# Patient Record
Sex: FEMALE | Race: NATIVE HAWAIIAN OR OTHER PACIFIC ISLANDER | Employment: FULL TIME | URBAN - METROPOLITAN AREA
[De-identification: names, ages, dates, MRNs, and addresses within clinical notes are randomized per-mention and may not be internally consistent; named-entity substitution may affect disease eponyms.]

---

## 2022-09-14 LAB
CHOLEST SERPL-MCNC: 159 MG/DL
GLUCOSE SERPL-MCNC: 96 MG/DL (ref 65–100)
HDLC SERPL-MCNC: 59 MG/DL (ref 40–60)
LDLC SERPL CALC-MCNC: 81.8 MG/DL
TRIGL SERPL-MCNC: 91 MG/DL (ref 35–150)

## 2022-11-14 ENCOUNTER — OFFICE VISIT (OUTPATIENT)
Dept: OCCUPATIONAL MEDICINE | Age: 27
End: 2022-11-14

## 2022-11-14 VITALS
DIASTOLIC BLOOD PRESSURE: 78 MMHG | SYSTOLIC BLOOD PRESSURE: 110 MMHG | OXYGEN SATURATION: 99 % | TEMPERATURE: 100.3 F | RESPIRATION RATE: 17 BRPM | HEIGHT: 65 IN | WEIGHT: 151.2 LBS | HEART RATE: 118 BPM | BODY MASS INDEX: 25.19 KG/M2

## 2022-11-14 DIAGNOSIS — R09.89 RUNNY NOSE: ICD-10-CM

## 2022-11-14 DIAGNOSIS — H66.93 BACTERIAL INFECTION OF BOTH EARS: ICD-10-CM

## 2022-11-14 DIAGNOSIS — B96.89 BACTERIAL INFECTION OF BOTH EARS: ICD-10-CM

## 2022-11-14 DIAGNOSIS — J10.1 INFLUENZA A: ICD-10-CM

## 2022-11-14 DIAGNOSIS — B00.1 COLD SORE: ICD-10-CM

## 2022-11-14 DIAGNOSIS — R05.9 COUGH IN ADULT PATIENT: Primary | ICD-10-CM

## 2022-11-14 PROBLEM — E55.9 VITAMIN D DEFICIENCY: Status: ACTIVE | Noted: 2020-05-14

## 2022-11-14 PROBLEM — L40.50 PSORIATIC ARTHRITIS (HCC): Status: ACTIVE | Noted: 2020-05-14

## 2022-11-14 PROBLEM — G89.29 CHRONIC BILATERAL LOW BACK PAIN WITHOUT SCIATICA: Status: ACTIVE | Noted: 2020-05-14

## 2022-11-14 PROBLEM — M54.50 CHRONIC BILATERAL LOW BACK PAIN WITHOUT SCIATICA: Status: ACTIVE | Noted: 2020-05-14

## 2022-11-14 PROBLEM — L40.9 PSORIASIS: Status: ACTIVE | Noted: 2020-05-14

## 2022-11-14 LAB
INFLUENZA A ANTIGEN, POC: POSITIVE
INFLUENZA B ANTIGEN, POC: NEGATIVE
SARS-COV-2 RNA, POC: NEGATIVE

## 2022-11-14 RX ORDER — VALACYCLOVIR HYDROCHLORIDE 1 G/1
2000 TABLET, FILM COATED ORAL 2 TIMES DAILY
Qty: 8 TABLET | Refills: 2 | Status: SHIPPED | OUTPATIENT
Start: 2022-11-14 | End: 2022-11-16

## 2022-11-14 RX ORDER — IBUPROFEN 800 MG/1
TABLET ORAL
COMMUNITY

## 2022-11-14 RX ORDER — SECUKINUMAB 150 MG/ML
INJECTION SUBCUTANEOUS
COMMUNITY
Start: 2022-10-24 | End: 2022-11-14 | Stop reason: SDUPTHER

## 2022-11-14 RX ORDER — FLUTICASONE PROPIONATE 50 MCG
2 SPRAY, SUSPENSION (ML) NASAL 2 TIMES DAILY
COMMUNITY
Start: 2017-05-03

## 2022-11-14 RX ORDER — CLOBETASOL PROPIONATE 0.5 MG/G
OINTMENT TOPICAL 2 TIMES DAILY
COMMUNITY
Start: 2020-05-14

## 2022-11-14 RX ORDER — AMOXICILLIN AND CLAVULANATE POTASSIUM 875; 125 MG/1; MG/1
1 TABLET, FILM COATED ORAL 2 TIMES DAILY
Qty: 20 TABLET | Refills: 0 | Status: SHIPPED | OUTPATIENT
Start: 2022-11-14 | End: 2022-11-24

## 2022-11-14 RX ORDER — MOMETASONE FUROATE 1 MG/ML
SOLUTION TOPICAL
COMMUNITY
Start: 2019-03-21

## 2022-11-14 RX ORDER — TRIAMCINOLONE ACETONIDE 1 MG/G
CREAM TOPICAL
COMMUNITY
Start: 2019-03-21

## 2022-11-14 RX ORDER — NAPROXEN 250 MG/1
TABLET ORAL
COMMUNITY

## 2022-11-14 RX ORDER — VALACYCLOVIR HYDROCHLORIDE 1 G/1
2000 TABLET, FILM COATED ORAL 2 TIMES DAILY
Qty: 8 TABLET | Refills: 2 | Status: SHIPPED | OUTPATIENT
Start: 2022-11-14 | End: 2022-11-14

## 2022-11-14 ASSESSMENT — ENCOUNTER SYMPTOMS
WHEEZING: 0
EYE PAIN: 0
NAUSEA: 0
DIARRHEA: 1
CHEST TIGHTNESS: 0
EYE DISCHARGE: 0
SORE THROAT: 1
COUGH: 1
RHINORRHEA: 1
TROUBLE SWALLOWING: 0
SWOLLEN GLANDS: 0
ABDOMINAL PAIN: 0
VOMITING: 0
SHORTNESS OF BREATH: 0
EYE ITCHING: 0
EYE REDNESS: 0
SINUS PAIN: 1

## 2022-11-14 ASSESSMENT — PATIENT HEALTH QUESTIONNAIRE - PHQ9
2. FEELING DOWN, DEPRESSED OR HOPELESS: 0
SUM OF ALL RESPONSES TO PHQ9 QUESTIONS 1 & 2: 0
SUM OF ALL RESPONSES TO PHQ QUESTIONS 1-9: 0
1. LITTLE INTEREST OR PLEASURE IN DOING THINGS: 0
SUM OF ALL RESPONSES TO PHQ QUESTIONS 1-9: 0

## 2022-11-14 NOTE — PROGRESS NOTES
PROGRESS NOTE    SUBJECTIVE     Surinder Brock is a 32 y.o. female seen for:    Chief Complaint    URI     . Patient states that she has had a cough and runny nose since Thursday night and believes she's been running a fever since then as well. Patient slept for 15 hours on Thursday night and went to urgent care on Friday where she had tested negative for COVID-19 and influenza. Her highest fever was 102.7F on Friday night. Patient states that the fever has persisted and usually comes back at night. Patient has been taking ibuprofen 800 mg every 8 hours and acetaminophen 1000 mg every 8 hours (alternating). Patient states that she continued to take ibuprofen and acetaminophen for her fever and body aches on Saturday, but feels she got much worse last night and now can't hear well out of her left ear. Patient has no appetite but has been trying to hydrate well with 3 x 16 ounces of water per day plus one bottle of Gatorade and some broth. Patient noted some diarrhea yesterday but hasn't eaten solid foods for several days. Patient states that her cough is productive with yellow brown sputum. Patient has fatigue with exertion and notes some dizziness with position changes of her head or when getting up from a chair. Patient's daughter (almost 3years old) was sick with influenza A since Friday 11/4/22. She is doing better now but ended up needing an oral steroid and albuterol inhaler for the cough. Her  had symptoms but didn't get tested feeling and is feeling better now. Patient notes that she has gotten cold sores in the past and now has one in the corner of the left side of her mouth that started with a burning sensation before she noted the cold sore. She has never taken Valtrex to help the cold sores go away faster before. Patient states her  gets cold sores as well.      Patient takes Cosentyx and topical steroids for plaque psoriasis and psoriatic arthritis, which has been managing her rashes and joint pains well but has noticed more joint pains since becoming ill with influenza A. Patient is not due for her next dose of Cosentyx for 2 more weeks. URI   This is a new problem. The current episode started in the past 7 days. The problem has been gradually worsening. The maximum temperature recorded prior to her arrival was 101 - 101.9 F. Associated symptoms include coughing, diarrhea, ear pain, headaches, a plugged ear sensation, a rash, rhinorrhea, sinus pain and a sore throat. Pertinent negatives include no abdominal pain, chest pain, congestion, dysuria, joint pain, joint swelling, nausea, neck pain, sneezing, swollen glands, vomiting or wheezing. She has tried acetaminophen and NSAIDs for the symptoms. The treatment provided mild relief. Current Outpatient Medications   Medication Sig Dispense Refill    triamcinolone (KENALOG) 0.1 % cream APPLY TO AFFECTED AREA TWICE A DAY X2WEEKS,THEN AS NEEDED FOR FLARES      naproxen (NAPROSYN) 250 MG tablet Take by mouth      mometasone (ELOCON) 0.1 % lotion APPLY TO AFFECTED AREA IN SCALP & EARS X2 WEEKS,THEN AS NEEDED FOR FLARES      ibuprofen (ADVIL;MOTRIN) 800 MG tablet Take by mouth      fluticasone (FLONASE) 50 MCG/ACT nasal spray 2 sprays by Nasal route 2 times daily      clobetasol (TEMOVATE) 0.05 % ointment Apply topically 2 times daily      amoxicillin-clavulanate (AUGMENTIN) 875-125 MG per tablet Take 1 tablet by mouth 2 times daily for 10 days 20 tablet 0    valACYclovir (VALTREX) 1 g tablet Take 2 tablets by mouth 2 times daily for 2 days 8 tablet 2    loratadine (CLARITIN) 10 MG tablet Take 10 mg by mouth      secukinumab (COSENTYX) 150 MG/ML SOSY Inject into the skin      brompheniramine-pseudoephedrine-DM 2-30-10 MG/5ML syrup Take 5 mLs by mouth 4 times daily as needed (Patient not taking: Reported on 11/14/2022)       No current facility-administered medications for this visit.       Allergies   Allergen Reactions    Pollen Extract Itching Past Medical History:   Diagnosis Date    Allergic rhinitis     Cold sore     Plaque psoriasis     Psoriatic arthritis ([address]      Past Surgical History:   Procedure Laterality Date    WISDOM TOOTH EXTRACTION         Social History     Tobacco Use    Smoking status: Never    Smokeless tobacco: Never   Vaping Use    Vaping Use: Never used   Substance Use Topics    Alcohol use: Yes     Comment: Once week - glass of wine    Drug use: Never        No family history on file. Review of Systems   Constitutional:  Positive for appetite change, chills, diaphoresis, fatigue and fever. Negative for unexpected weight change. HENT:  Positive for ear pain, postnasal drip, rhinorrhea, sinus pain and sore throat. Negative for congestion, drooling, ear discharge, sneezing, tinnitus and trouble swallowing. Eyes:  Negative for pain, discharge, redness, itching and visual disturbance. Respiratory:  Positive for cough. Negative for chest tightness, shortness of breath and wheezing. Cardiovascular:  Negative for chest pain and palpitations. Gastrointestinal:  Positive for diarrhea. Negative for abdominal pain, nausea and vomiting. Genitourinary:  Negative for dysuria. Musculoskeletal:  Positive for myalgias. Negative for gait problem, joint pain, neck pain and neck stiffness. Skin:  Positive for rash. History of plaque psoriasis; on Cosentyx and other topical steroids with good control   Neurological:  Positive for headaches. Psychiatric/Behavioral:  Negative for dysphoric mood and suicidal ideas. OBJECTIVE    /78 (Site: Right Upper Arm, Position: Sitting, Cuff Size: Medium Adult)   Pulse (!) 118   Temp 100.3 °F (37.9 °C) (Oral)   Resp 17   Ht 5' 5\" (1.651 m)   Wt 151 lb 3.2 oz (68.6 kg)   LMP 10/26/2022 (Approximate) Comment: tubal ligation  SpO2 99%   BMI 25.16 kg/m²    PHQ-9 Total Score: 0 (11/14/2022  8:51 AM)      Physical Exam  Vitals reviewed.    Constitutional:       General: She is not in acute distress. Appearance: Normal appearance. She is ill-appearing. She is not toxic-appearing or diaphoretic. HENT:      Head: Normocephalic. Jaw: No trismus. Right Ear: Ear canal and external ear normal. No decreased hearing noted. No laceration, drainage, swelling or tenderness. No middle ear effusion. There is no impacted cerumen. No foreign body. No mastoid tenderness. No PE tube. No hemotympanum. Tympanic membrane is injected, erythematous and bulging. Tympanic membrane is not scarred, perforated or retracted. Left Ear: Ear canal and external ear normal. Decreased hearing noted. No laceration, drainage, swelling or tenderness. No middle ear effusion. There is no impacted cerumen. No foreign body. No mastoid tenderness. No PE tube. No hemotympanum. Tympanic membrane is injected, erythematous and bulging. Tympanic membrane is not scarred, perforated or retracted. Nose: Congestion and rhinorrhea present. Right Nostril: No foreign body, epistaxis or occlusion. Left Nostril: No foreign body, epistaxis or occlusion. Right Turbinates: Not enlarged, swollen or pale. Left Turbinates: Not enlarged, swollen or pale. Right Sinus: Maxillary sinus tenderness present. No frontal sinus tenderness. Left Sinus: Maxillary sinus tenderness present. No frontal sinus tenderness. Comments: Reddened turbinates     Mouth/Throat:      Mouth: Mucous membranes are moist.      Pharynx: Oropharynx is clear. Uvula midline. Posterior oropharyngeal erythema present. No pharyngeal swelling, oropharyngeal exudate or uvula swelling. Tonsils: No tonsillar exudate or tonsillar abscesses. 2+ on the right. 2+ on the left. Comments: Erythemic appearance of the posterior pharynx and tonsils  Eyes:      General: No scleral icterus. Right eye: No discharge. Left eye: No discharge. Extraocular Movements: Extraocular movements intact. Conjunctiva/sclera: Conjunctivae normal.      Pupils: Pupils are equal, round, and reactive to light. Cardiovascular:      Rate and Rhythm: Normal rate and regular rhythm. Pulses: Normal pulses. Radial pulses are 2+ on the right side and 2+ on the left side. Heart sounds: Normal heart sounds. No murmur heard. No friction rub. No gallop. Pulmonary:      Effort: Pulmonary effort is normal. No respiratory distress. Breath sounds: Normal breath sounds. No stridor. No wheezing, rhonchi or rales. Musculoskeletal:         General: No swelling. Normal range of motion. Cervical back: Normal range of motion and neck supple. No rigidity. Lymphadenopathy:      Cervical: Cervical adenopathy present. Right cervical: Superficial cervical adenopathy present. Left cervical: Superficial cervical adenopathy present. Skin:     General: Skin is warm and dry. Capillary Refill: Capillary refill takes less than 2 seconds. Coloration: Skin is not jaundiced or pale. Findings: Rash present. No bruising, erythema or lesion. Rash is crusting and vesicular. Rash is not macular, nodular, papular, purpuric, pustular, scaling or urticarial.             Comments: Did not evaluate patient for areas of plaque psoriasis at this visit; patient did not complain of any worsening areas of plaque psoriasis. Neurological:      General: No focal deficit present. Mental Status: She is alert and oriented to person, place, and time. Mental status is at baseline. Motor: No weakness. Coordination: Coordination normal.      Gait: Gait normal.   Psychiatric:         Mood and Affect: Mood normal.         Behavior: Behavior normal.         Thought Content:  Thought content normal.         Judgment: Judgment normal.       Results for orders placed or performed in visit on 11/14/22   AMB POC SARS-COV-2 AND INFLUENZA A/B   Result Value Ref Range    SARS-COV-2 RNA, POC Negative Influenza A Antigen, POC Positive Negative    Influenza B Antigen, POC Negative Negative        No results found for: WBC, HGB, HCT, MCV, PLT    Lab Results   Component Value Date    GLUCOSE 96 09/14/2022           Lab Results   Component Value Date/Time    GLUCOSE 96 09/14/2022 06:14 AM        No results found for: LABA1C    Lab Results   Component Value Date    CHOL 159 09/14/2022     Lab Results   Component Value Date    TRIG 91 09/14/2022     Lab Results   Component Value Date    HDL 59 09/14/2022     Lab Results   Component Value Date    LDLCALC 81.8 09/14/2022     No results found for: LABVLDL, VLDL  No results found for: CHOLHDLRATIO    No results found for: TSHFT4, TSH, TSHREFLEX, AEK7DDC    ASSESSMENT and PLAN    Prateek Meyers was seen today for uri. Diagnoses and all orders for this visit:    Cough in adult patient  -     AMB POC SARS-COV-2 AND INFLUENZA A/B    Runny nose  -     AMB POC SARS-COV-2 AND INFLUENZA A/B    Bacterial infection of both ears  -     amoxicillin-clavulanate (AUGMENTIN) 875-125 MG per tablet; Take 1 tablet by mouth 2 times daily for 10 days    Cold sore  -     Discontinue: valACYclovir (VALTREX) 1 g tablet; Take 2 tablets by mouth 2 times daily for 6 days  -     valACYclovir (VALTREX) 1 g tablet; Take 2 tablets by mouth 2 times daily for 2 days    Influenza A    Explained to patient that she tested negative for COVID-19 and POSITIVE influenza on rapid tests done in this office today. Patient also has a bilateral ear infection and is showing initial signs of a sinus infection. Will start antibiotics (Augmentin) and continue for at least 7 days but may extend to 10 days based on symptoms. Advised patient that her fever and ear and sinus pain should be improving in 2-3 days on antibiotics but she may see a persistent cough. Follow up with the employee wellness center if not seeing improvement on antibiotics in 2-3 days.      Will also start Valtrex 2gm twice a day x 2 days with 2 refills should the cold sores occur at a later date. It is best to take Valtrex right when you feel the burning sensation. Recommended that the patient increase rest and fluids (work note provided for patient to be off work until 11/18/22). Advised patient that her heart rate is 118 bpm today, which is likely from the fever but also from some dehydration. Ensure adequate hydration (over 64 ounces of water per day) given that the fever is persisting. Advised ibuprofen or acetaminophen (take according to package instructions) - would decrease ibuprofen from 800 mg to 600 mg three times a day and make sure acetaminophen does not go above 3000 mg in a day. Continue Dayquil during the day and Nyquil at night, but note how much acetaminophen is in these products so as not to go above 3000 mg per day. Can repeat the dose of Nyquil in the middle of the night - follow package instructions. Educated patient on using the neti pot, including boiling the water first and letting it cool to a warm temperature before performing the sinus rinse. When ready to perform sinus rinse, pour the warm water into the neti pot and add a saline packet to the water. Gentle swirl the water in the pot to distribute the contents of the saline packet. It is best to do neti pot in the morning and night and use the Flonase after doing a sinus rinse. Gargle with the leftover salt water from the neti pot. For the Flonase nasal spray, you have the option to use the spray once or twice a day - if once a day, spray 2 puffs into each nostril and if twice a day, spray 1 puff into each nostril. Spray with your head down and and try to spray within the nose as opposed to down the back of your throat. Flonase takes several days to a week to work so continue to use this daily to see the best effects. Can take Mucinex/Guaifenesin to help thin secretions and make it easier to cough up or remove secretions from the nose.  Honey is also a natural alternative to help suppress a cough and has been shown to be just as effective as dextromethorphan (or \"DM\" type cough medicines). Honey has the added benefit of not interacting with certain medications that increase serotonin levels in the body, such as anti-depressants (SSRIs/SNRIs), anti-emetics (zofran), or migraine medications (triptans). Reminded patient about the [employer] benefit offered through [facility], where employees (and their dependents) get free phone support, as well as referrals for in-person consultations with clinical, legal and financial professionals. To access these resources, call [phone], text [place]YA to 23110, or visit [employer] (password: [email or web address]). Return to the clinic for persisting/worsening symptoms or new complaints that arise. Discussed signs and symptoms that would warrant immediate evaluation including, but not limited to severe headache, blurred vision, speech disturbance, difficulty with ambulation/gait, numbness, tingling, weakness, syncope, chest pain, or shortness of breath. Side effects and risk vs benefits associated with medications prescribed were discussed with patient who verbalized understanding. [employer] verbalized understanding and agreement with above plan of care. Counseled on benefits of having a primary care provider which includes, but is not limited to, continuity of care and having a medical home when concerns arise. Also enforced that onsite clinic policy states that we are not to take the place of a primary care provider. I have reviewed the patient's medication list, past medical, family, social, and surgical history in detail and updated the patient record appropriately.      Susan Padilla, APRN - NP

## 2022-11-14 NOTE — LETTER
[address] [place] [id number]  Phone: [phone]  Fax: [phone]    VIRGINIA Linares NP        November 14, 2022     Patient: Alfredito Briones   YOB: 1995   Date of Visit: 11/14/2022       To Whom It May Concern: It is my medical opinion that Alfredito Briones should remain out of work until Friday, November, 18th. Patient may return to work by Wednesday, November 16th if feeling better and fever free for 24 hours without antipyretics. .    If you have any questions or concerns, please don't hesitate to call.     Sincerely,        VIRGINIA Linares NP

## 2022-11-16 ENCOUNTER — TELEPHONE (OUTPATIENT)
Dept: OCCUPATIONAL MEDICINE | Age: 27
End: 2022-11-16

## 2022-11-16 NOTE — TELEPHONE ENCOUNTER
Called patient to follow up on her symptoms. Patient states that she is tolerating the Augmentin well and it seems to have helped her throat and sometimes helps her ears. Patient states that her ears will be okay until she has to blow her nose or sneeze and then they clog back up again. Patient persists with her fever and continues to take Dayquil and Nyquil as well as ibuprofen and Flonase. Patient states that she's not sure if she will be able to go to work on Friday. Advised patient to continue her treatments as stated but to make sure she's not taking more than 3000 mg acetaminophen with the Dayquil and Nyquil dosages. Advised patient that the Flonase takes a full week to see all it's effects and it will help reduce pressure on the ears over time. Will email patient a new excuse note, giving her off until Monday, November 21st so that she has more time to rest and recover from influenza A. Patient verbalized understanding and agreed with the above plan of care. Advised patient to follow up with the [facility] (employee wellness center) at [phone] if they need anything else.      VIRGINIA Hidalgo - NP

## 2022-11-29 ENCOUNTER — OFFICE VISIT (OUTPATIENT)
Dept: OCCUPATIONAL MEDICINE | Age: 27
End: 2022-11-29

## 2022-11-29 VITALS
OXYGEN SATURATION: 98 % | WEIGHT: 150 LBS | RESPIRATION RATE: 16 BRPM | TEMPERATURE: 98.6 F | HEIGHT: 65 IN | DIASTOLIC BLOOD PRESSURE: 72 MMHG | SYSTOLIC BLOOD PRESSURE: 105 MMHG | BODY MASS INDEX: 24.99 KG/M2 | HEART RATE: 82 BPM

## 2022-11-29 DIAGNOSIS — H93.8X2 CONGESTION OF LEFT EAR: ICD-10-CM

## 2022-11-29 DIAGNOSIS — Z76.89 RETURN TO WORK EVALUATION: Primary | ICD-10-CM

## 2022-11-29 ASSESSMENT — ENCOUNTER SYMPTOMS
EYE ITCHING: 0
NAUSEA: 0
VOMITING: 0
SINUS PRESSURE: 0
SORE THROAT: 0
COUGH: 1
SINUS PAIN: 0
EYE REDNESS: 0
RHINORRHEA: 1
ABDOMINAL PAIN: 0
EYE DISCHARGE: 0
WHEEZING: 0
SHORTNESS OF BREATH: 0
DIARRHEA: 0
EYE PAIN: 0

## 2022-11-29 ASSESSMENT — PATIENT HEALTH QUESTIONNAIRE - PHQ9
SUM OF ALL RESPONSES TO PHQ9 QUESTIONS 1 & 2: 0
1. LITTLE INTEREST OR PLEASURE IN DOING THINGS: 0
SUM OF ALL RESPONSES TO PHQ QUESTIONS 1-9: 0
2. FEELING DOWN, DEPRESSED OR HOPELESS: 0
SUM OF ALL RESPONSES TO PHQ QUESTIONS 1-9: 0

## 2022-11-29 NOTE — PROGRESS NOTES
PROGRESS NOTE    ROSA Hobson is a 32 y.o. female seen for:    Chief Complaint    Other     . Patient states that she is feeling better but still having some lingering symptoms in her left ear. Patient states that she completed the antibiotics as prescribed. Patient states that she missed 6 days of work due recovering from influenza A and a double ear infection and her boss is now asking her to complete a \"WORKABILITY EVALUATION / RETURN TO WORK CLEARANCE\" form from [facility]. Patient attached the form in a previous message. Current Outpatient Medications   Medication Sig Dispense Refill    triamcinolone (KENALOG) 0.1 % cream APPLY TO AFFECTED AREA TWICE A DAY X2WEEKS,THEN AS NEEDED FOR FLARES      naproxen (NAPROSYN) 250 MG tablet Take by mouth      mometasone (ELOCON) 0.1 % lotion APPLY TO AFFECTED AREA IN SCALP & EARS X2 WEEKS,THEN AS NEEDED FOR FLARES      ibuprofen (ADVIL;MOTRIN) 800 MG tablet Take by mouth      fluticasone (FLONASE) 50 MCG/ACT nasal spray 2 sprays by Nasal route 2 times daily      clobetasol (TEMOVATE) 0.05 % ointment Apply topically 2 times daily      loratadine (CLARITIN) 10 MG tablet Take 10 mg by mouth      brompheniramine-pseudoephedrine-DM 2-30-10 MG/5ML syrup Take 5 mLs by mouth 4 times daily as needed (Patient not taking: Reported on 11/14/2022)      secukinumab (COSENTYX) 150 MG/ML SOSY Inject into the skin       No current facility-administered medications for this visit.       Allergies   Allergen Reactions    Pollen Extract Itching     Past Medical History:   Diagnosis Date    Allergic rhinitis     Cold sore     Plaque psoriasis     Psoriatic arthritis ([address]      Past Surgical History:   Procedure Laterality Date    WISDOM TOOTH EXTRACTION         Social History     Tobacco Use    Smoking status: Never    Smokeless tobacco: Never   Vaping Use    Vaping Use: Never used   Substance Use Topics    Alcohol use: Yes     Comment: Once week - glass of wine    Drug use: Never        No family history on file. Review of Systems   Constitutional:  Negative for chills, fatigue, fever and unexpected weight change. HENT:  Positive for congestion and rhinorrhea. Negative for ear discharge, ear pain, postnasal drip, sinus pressure, sinus pain and sore throat. Ear congestion but not pain. Eyes:  Negative for pain, discharge, redness, itching and visual disturbance. Respiratory:  Positive for cough. Negative for shortness of breath and wheezing. Cardiovascular:  Negative for chest pain. Gastrointestinal:  Negative for abdominal pain, diarrhea, nausea and vomiting. Musculoskeletal:  Negative for myalgias. Skin:  Negative for rash. History of plaque psoriasis; on Cosentyx and other topical steroids with good control    Neurological:  Negative for dizziness, syncope, weakness, light-headedness and headaches. Psychiatric/Behavioral:  Negative for dysphoric mood and suicidal ideas. OBJECTIVE    /72 (Site: Right Upper Arm, Position: Sitting, Cuff Size: Medium Adult)   Pulse 82   Temp 98.6 °F (37 °C) (Temporal)   Resp 16   Ht 5' 5\" (1.651 m)   Wt 150 lb (68 kg)   SpO2 98%   BMI 24.96 kg/m²    PHQ-9 Total Score: 0 (11/29/2022 10:27 AM)    Physical Exam  Vitals reviewed. Constitutional:       General: She is not in acute distress. Appearance: Normal appearance. She is not ill-appearing, toxic-appearing or diaphoretic. HENT:      Head: Normocephalic. Jaw: No trismus. Right Ear: Hearing, tympanic membrane, ear canal and external ear normal. There is no impacted cerumen. Left Ear: Hearing, tympanic membrane, ear canal and external ear normal. There is no impacted cerumen. Nose: Congestion and rhinorrhea present. Right Nostril: No foreign body, epistaxis or occlusion. Left Nostril: No foreign body, epistaxis or occlusion. Right Turbinates: Not enlarged, swollen or pale.       Left Turbinates: Not enlarged, swollen or pale. Right Sinus: No maxillary sinus tenderness or frontal sinus tenderness. Left Sinus: No maxillary sinus tenderness or frontal sinus tenderness. Comments: Slightly reddened turbinates     Mouth/Throat:      Mouth: Mucous membranes are moist. No oral lesions. Pharynx: Oropharynx is clear. Uvula midline. Posterior oropharyngeal erythema present. No pharyngeal swelling, oropharyngeal exudate or uvula swelling. Tonsils: No tonsillar exudate or tonsillar abscesses. 1+ on the right. 1+ on the left. Comments: Slightly erythemic appearance of the posterior pharynx and tonsils; cold sore on lip is resolved  Eyes:      General: No scleral icterus. Right eye: No discharge. Left eye: No discharge. Extraocular Movements: Extraocular movements intact. Conjunctiva/sclera: Conjunctivae normal.   Cardiovascular:      Rate and Rhythm: Normal rate and regular rhythm. Pulses: Normal pulses. Radial pulses are 2+ on the right side and 2+ on the left side. Heart sounds: Normal heart sounds. No murmur heard. No friction rub. No gallop. Pulmonary:      Effort: Pulmonary effort is normal. No respiratory distress. Breath sounds: Normal breath sounds. No stridor. No wheezing, rhonchi or rales. Musculoskeletal:         General: No swelling. Normal range of motion. Cervical back: Normal range of motion and neck supple. No rigidity. Lymphadenopathy:      Cervical: No cervical adenopathy. Skin:     General: Skin is warm and dry. Capillary Refill: Capillary refill takes less than 2 seconds. Coloration: Skin is not jaundiced or pale. Findings: No erythema or rash. Neurological:      General: No focal deficit present. Mental Status: She is alert and oriented to person, place, and time. Mental status is at baseline. Motor: No weakness.       Coordination: Coordination normal.      Gait: Gait normal.   Psychiatric: Mood and Affect: Mood normal.         Behavior: Behavior normal.         Thought Content: Thought content normal.         Judgment: Judgment normal.       No results found for this visit on 11/29/22. No results found for: WBC, HGB, HCT, MCV, PLT    Lab Results   Component Value Date    GLUCOSE 96 09/14/2022           Lab Results   Component Value Date/Time    GLUCOSE 96 09/14/2022 06:14 AM        No results found for: LABA1C    Lab Results   Component Value Date    CHOL 159 09/14/2022     Lab Results   Component Value Date    TRIG 91 09/14/2022     Lab Results   Component Value Date    HDL 59 09/14/2022     Lab Results   Component Value Date    LDLCALC 81.8 09/14/2022     No results found for: LABVLDL, VLDL  No results found for: CHOLHDLRATIO    No results found for: TSHFT4, TSH, TSHREFLEX, AYH0PGT    ASSESSMENT and PLAN    Venice Jackson was seen today for other. Diagnoses and all orders for this visit:    Return to work evaluation  Comments:  Completed the etouches return to work paperwork without any restrictions - patient has been back to work since 11/21/22. Congestion of left ear    Advised patient that her ear symptoms should continue to improve within the next few days, but she can return to the employee wellness center if it doesn't completely resolve. Discussed signs and symptoms that would warrant immediate evaluation including, but not limited to severe headache, blurred vision, speech disturbance, difficulty with ambulation/gait, numbness, tingling, weakness, syncope, chest pain, or shortness of breath. Side effects and risk vs benefits associated with medications prescribed were discussed with patient who verbalized understanding. Corrine verbalized understanding and agreement with above plan of care. Counseled on benefits of having a primary care provider which includes, but is not limited to, continuity of care and having a medical home when concerns arise.  Also enforced that onsite clinic policy states that we are not to take the place of a primary care provider. I have reviewed the patient's medication list, past medical, family, social, and surgical history in detail and updated the patient record appropriately.      VIRGINIA Lopez - NP

## 2023-10-03 ENCOUNTER — OFFICE VISIT (OUTPATIENT)
Dept: OCCUPATIONAL MEDICINE | Age: 28
End: 2023-10-03

## 2023-10-03 VITALS
BODY MASS INDEX: 23.82 KG/M2 | DIASTOLIC BLOOD PRESSURE: 70 MMHG | SYSTOLIC BLOOD PRESSURE: 114 MMHG | HEART RATE: 71 BPM | TEMPERATURE: 98 F | RESPIRATION RATE: 16 BRPM | OXYGEN SATURATION: 98 % | WEIGHT: 143 LBS | HEIGHT: 65 IN

## 2023-10-03 DIAGNOSIS — H60.91 INFLAMMATION OF RIGHT EAR CANAL: Primary | ICD-10-CM

## 2023-10-03 RX ORDER — SECUKINUMAB 150 MG/ML
INJECTION SUBCUTANEOUS
COMMUNITY
Start: 2023-07-17

## 2023-10-03 RX ORDER — LEVOCETIRIZINE DIHYDROCHLORIDE 5 MG/1
5 TABLET, FILM COATED ORAL NIGHTLY
COMMUNITY

## 2023-10-03 RX ORDER — FLUOCINOLONE ACETONIDE 0.11 MG/ML
5 OIL AURICULAR (OTIC) 2 TIMES DAILY
Qty: 20 ML | Refills: 0 | Status: SHIPPED | OUTPATIENT
Start: 2023-10-03 | End: 2023-10-17

## 2023-10-03 ASSESSMENT — ENCOUNTER SYMPTOMS
VOMITING: 0
RHINORRHEA: 0
ABDOMINAL PAIN: 0
DIARRHEA: 0
NAUSEA: 0
SHORTNESS OF BREATH: 0
SORE THROAT: 0
COUGH: 0
CHEST TIGHTNESS: 0

## 2023-10-03 ASSESSMENT — PATIENT HEALTH QUESTIONNAIRE - PHQ9
SUM OF ALL RESPONSES TO PHQ QUESTIONS 1-9: 0
SUM OF ALL RESPONSES TO PHQ9 QUESTIONS 1 & 2: 0
SUM OF ALL RESPONSES TO PHQ QUESTIONS 1-9: 0
1. LITTLE INTEREST OR PLEASURE IN DOING THINGS: 0
2. FEELING DOWN, DEPRESSED OR HOPELESS: 0
SUM OF ALL RESPONSES TO PHQ QUESTIONS 1-9: 0
SUM OF ALL RESPONSES TO PHQ QUESTIONS 1-9: 0

## 2023-10-03 NOTE — PROGRESS NOTES
PROGRESS NOTE    SUBJECTIVE     Florin Warner is a 25 y.o. female seen for:    Chief Complaint    Otalgia         Patient presents to the [employer] employee wellness center for right ear pain for the last 2-3 days. Patient denies any cold symptoms. Patient notes that she has psoriatic arthritis and her ears are always itching her. Her arthritis and rashes are stable on Cosentyx, but her ears still bother her. She cleans her ears every day with a q-tip and noticed more pain 3 days ago. Current Outpatient Medications   Medication Sig Dispense Refill    COSENTYX SENSOREADY, 300 MG, 150 MG/ML SOAJ       levocetirizine (XYZAL) 5 MG tablet Take 1 tablet by mouth nightly      fluocinolone (DERMOTIC) 0.01 % OIL oil Place 5 drops into the right ear 2 times daily for 14 days 20 mL 0    triamcinolone (KENALOG) 0.1 % cream APPLY TO AFFECTED AREA TWICE A DAY X2WEEKS,THEN AS NEEDED FOR FLARES      mometasone (ELOCON) 0.1 % lotion APPLY TO AFFECTED AREA IN SCALP & EARS X2 WEEKS,THEN AS NEEDED FOR FLARES      fluticasone (FLONASE) 50 MCG/ACT nasal spray 2 sprays by Nasal route 2 times daily      clobetasol (TEMOVATE) 0.05 % ointment Apply topically 2 times daily       No current facility-administered medications for this visit. Allergies   Allergen Reactions    Pollen Extract Itching     Past Medical History:   Diagnosis Date    Allergic rhinitis     Cold sore     Plaque psoriasis     Psoriatic arthritis ([address])      Past Surgical History:   Procedure Laterality Date    WISDOM TOOTH EXTRACTION         Social History     Tobacco Use    Smoking status: Never    Smokeless tobacco: Never   Vaping Use    Vaping Use: Never used   Substance Use Topics    Alcohol use: Yes     Comment: Once week - glass of wine    Drug use: Never        No family history on file. Review of Systems   Constitutional:  Negative for chills, diaphoresis, fatigue and fever. HENT:  Positive for ear pain.  Negative for congestion, ear discharge,

## 2023-11-08 SDOH — HEALTH STABILITY: PHYSICAL HEALTH: ON AVERAGE, HOW MANY DAYS PER WEEK DO YOU ENGAGE IN MODERATE TO STRENUOUS EXERCISE (LIKE A BRISK WALK)?: 5 DAYS

## 2023-11-08 SDOH — HEALTH STABILITY: PHYSICAL HEALTH: ON AVERAGE, HOW MANY MINUTES DO YOU ENGAGE IN EXERCISE AT THIS LEVEL?: 60 MIN

## 2023-11-09 ENCOUNTER — OFFICE VISIT (OUTPATIENT)
Dept: INTERNAL MEDICINE CLINIC | Facility: CLINIC | Age: 28
End: 2023-11-09
Payer: COMMERCIAL

## 2023-11-09 VITALS
HEART RATE: 94 BPM | DIASTOLIC BLOOD PRESSURE: 68 MMHG | WEIGHT: 141 LBS | OXYGEN SATURATION: 98 % | HEIGHT: 65 IN | SYSTOLIC BLOOD PRESSURE: 103 MMHG | BODY MASS INDEX: 23.49 KG/M2 | TEMPERATURE: 98.6 F

## 2023-11-09 DIAGNOSIS — Z76.89 ESTABLISHING CARE WITH NEW DOCTOR, ENCOUNTER FOR: ICD-10-CM

## 2023-11-09 DIAGNOSIS — Z12.4 CERVICAL CANCER SCREENING: ICD-10-CM

## 2023-11-09 DIAGNOSIS — Z13.1 ENCOUNTER FOR SCREENING FOR DIABETES MELLITUS: ICD-10-CM

## 2023-11-09 DIAGNOSIS — E55.9 VITAMIN D DEFICIENCY: ICD-10-CM

## 2023-11-09 DIAGNOSIS — R73.9 HYPERGLYCEMIA: ICD-10-CM

## 2023-11-09 DIAGNOSIS — L40.50 PSORIATIC ARTHRITIS (HCC): ICD-10-CM

## 2023-11-09 DIAGNOSIS — Z13.29 THYROID DISORDER SCREEN: ICD-10-CM

## 2023-11-09 DIAGNOSIS — Z85.43 HX OF OVARIAN CANCER: ICD-10-CM

## 2023-11-09 DIAGNOSIS — Z76.89 ESTABLISHING CARE WITH NEW DOCTOR, ENCOUNTER FOR: Primary | ICD-10-CM

## 2023-11-09 DIAGNOSIS — Z13.220 ENCOUNTER FOR SCREENING FOR LIPID DISORDER: ICD-10-CM

## 2023-11-09 DIAGNOSIS — Z90.79 HX OF BILATERAL SALPINGECTOMY: ICD-10-CM

## 2023-11-09 DIAGNOSIS — G89.29 CHRONIC BILATERAL LOW BACK PAIN WITHOUT SCIATICA: ICD-10-CM

## 2023-11-09 DIAGNOSIS — Z80.3 FAMILY HISTORY OF BREAST CANCER IN MOTHER: ICD-10-CM

## 2023-11-09 DIAGNOSIS — M54.50 CHRONIC BILATERAL LOW BACK PAIN WITHOUT SCIATICA: ICD-10-CM

## 2023-11-09 PROCEDURE — 99204 OFFICE O/P NEW MOD 45 MIN: CPT | Performed by: INTERNAL MEDICINE

## 2023-11-09 SDOH — ECONOMIC STABILITY: INCOME INSECURITY: HOW HARD IS IT FOR YOU TO PAY FOR THE VERY BASICS LIKE FOOD, HOUSING, MEDICAL CARE, AND HEATING?: NOT HARD AT ALL

## 2023-11-09 SDOH — ECONOMIC STABILITY: HOUSING INSECURITY
IN THE LAST 12 MONTHS, WAS THERE A TIME WHEN YOU DID NOT HAVE A STEADY PLACE TO SLEEP OR SLEPT IN A SHELTER (INCLUDING NOW)?: NO

## 2023-11-09 SDOH — ECONOMIC STABILITY: FOOD INSECURITY: WITHIN THE PAST 12 MONTHS, YOU WORRIED THAT YOUR FOOD WOULD RUN OUT BEFORE YOU GOT MONEY TO BUY MORE.: NEVER TRUE

## 2023-11-09 SDOH — ECONOMIC STABILITY: FOOD INSECURITY: WITHIN THE PAST 12 MONTHS, THE FOOD YOU BOUGHT JUST DIDN'T LAST AND YOU DIDN'T HAVE MONEY TO GET MORE.: NEVER TRUE

## 2023-11-09 ASSESSMENT — PATIENT HEALTH QUESTIONNAIRE - PHQ9
SUM OF ALL RESPONSES TO PHQ QUESTIONS 1-9: 0
2. FEELING DOWN, DEPRESSED OR HOPELESS: 0
SUM OF ALL RESPONSES TO PHQ9 QUESTIONS 1 & 2: 0
1. LITTLE INTEREST OR PLEASURE IN DOING THINGS: 0
SUM OF ALL RESPONSES TO PHQ QUESTIONS 1-9: 0

## 2023-11-09 NOTE — ACP (ADVANCE CARE PLANNING)
Advance Care Planning   The patient has the following advanced directives on file:  Advance Directives       Power of [address] Will ACP-Advance Directive ACP-Power of     Not on File Not on File Not on File Not on File            The patient has appointed the following active healthcare agents:    Primary Decision Maker: EvelinaChaim - Spouse - [phone]    The Patient has the following current code status:    Code Status: Not on file    Visit Documentation:  I discussed [address] [place] [address] with Mandi Gomez today which included the importance of making their choices for care and treatment in the case of a health event that adversely affects their decision-making abilities. She has not completed the Advance Care Directives. She does not have an active health care agent at this time. Mandi Gomez was encouraged to complete the declaration forms and provide a signed copy of her medical records.          Rick Saucedo  11/9/2023

## 2023-11-09 NOTE — PROGRESS NOTES
Emil Goldmann (: 1995) is a 25 y.o. female, here for evaluation of the following chief complaint(s):  New Patient (Pt is here to estab PCP care. Pt states she has not a primary in over 10 years.) and Referral - General (Pt needs a referral OBGYN)       ASSESSMENT/PLAN:  1. Establishing care with new doctor, encounter for  -     Comprehensive Metabolic Panel; Future  2. Psoriatic arthritis ([address])  -     Comprehensive Metabolic Panel; Future  3. Chronic bilateral low back pain without sciatica  4. Vitamin D deficiency  -     Comprehensive Metabolic Panel; Future  5. Cervical cancer screening  -     [facility]  6. Hx of bilateral salpingectomy  7. Family history of breast cancer in mother  Comments:  Diagnosed at the age of 23. Orders:  -     LILIANE DIGITAL SCREEN W OR WO CAD BILATERAL; Future  -     [address] [facility] ([facility]etics)  8. Hx of ovarian cancer  -     LILIANE DIGITAL SCREEN W OR WO CAD BILATERAL; Future  -     [address] [facility] (Genetics)  9. Encounter for screening for diabetes mellitus  10. Encounter for screening for lipid disorder  -     Lipid Panel; Future  11. Thyroid disorder screen  12. Hyperglycemia  -     Hemoglobin A1C; Future             SUBJECTIVE/OBJECTIVE:  HPI:Patient is a pleasant 1-year-old establishing with a new PCP. She has been following with Rheumatology for psoriatic arthritis. No specific complaints a this time. Would appreciate gynecology referral for routine pap. Strong family history of cancers with Breast cancer with mother x 2 first at the age of 25 and again at the age 25. Grandmother with a medical history significant for Ovarian cancer. Interested in referral to hematology/oncology for genetic counseling/testing for BRCA1/2. Pre-baby weight 120. Currently 140. Child Adylinn.     Social History     Tobacco Use    Smoking status: Never    Smokeless tobacco: Never   Vaping Use    Vaping Use:

## 2023-11-10 LAB
ALBUMIN SERPL-MCNC: 4.8 G/DL (ref 3.5–5)
ALBUMIN/GLOB SERPL: 1.3 (ref 0.4–1.6)
ALP SERPL-CCNC: 49 U/L (ref 50–136)
ALT SERPL-CCNC: 17 U/L (ref 12–65)
ANION GAP SERPL CALC-SCNC: 7 MMOL/L (ref 2–11)
AST SERPL-CCNC: 16 U/L (ref 15–37)
BILIRUB SERPL-MCNC: 0.5 MG/DL (ref 0.2–1.1)
BUN SERPL-MCNC: 18 MG/DL (ref 6–23)
CALCIUM SERPL-MCNC: 9.5 MG/DL (ref 8.3–10.4)
CHLORIDE SERPL-SCNC: 103 MMOL/L (ref 101–110)
CHOLEST SERPL-MCNC: 177 MG/DL
CO2 SERPL-SCNC: 27 MMOL/L (ref 21–32)
CREAT SERPL-MCNC: 0.7 MG/DL (ref 0.6–1)
EST. AVERAGE GLUCOSE BLD GHB EST-MCNC: 97 MG/DL
GLOBULIN SER CALC-MCNC: 3.8 G/DL (ref 2.8–4.5)
GLUCOSE SERPL-MCNC: 79 MG/DL (ref 65–100)
HBA1C MFR BLD: 5 % (ref 4.8–5.6)
HDLC SERPL-MCNC: 68 MG/DL (ref 40–60)
HDLC SERPL: 2.6
LDLC SERPL CALC-MCNC: 96.4 MG/DL
POTASSIUM SERPL-SCNC: 3.8 MMOL/L (ref 3.5–5.1)
PROT SERPL-MCNC: 8.6 G/DL (ref 6.3–8.2)
SODIUM SERPL-SCNC: 137 MMOL/L (ref 133–143)
TRIGL SERPL-MCNC: 63 MG/DL (ref 35–150)
VLDLC SERPL CALC-MCNC: 12.6 MG/DL (ref 6–23)

## 2023-12-09 PROBLEM — Z12.4 CERVICAL CANCER SCREENING: Status: RESOLVED | Noted: 2023-11-09 | Resolved: 2023-12-09

## 2023-12-11 ENCOUNTER — OFFICE VISIT (OUTPATIENT)
Dept: OBGYN CLINIC | Age: 28
End: 2023-12-11
Payer: COMMERCIAL

## 2023-12-11 VITALS
HEIGHT: 65 IN | SYSTOLIC BLOOD PRESSURE: 118 MMHG | BODY MASS INDEX: 23.66 KG/M2 | DIASTOLIC BLOOD PRESSURE: 72 MMHG | WEIGHT: 142 LBS

## 2023-12-11 DIAGNOSIS — Z01.419 WOMEN'S ANNUAL ROUTINE GYNECOLOGICAL EXAMINATION: ICD-10-CM

## 2023-12-11 DIAGNOSIS — Z01.419 WOMEN'S ANNUAL ROUTINE GYNECOLOGICAL EXAMINATION: Primary | ICD-10-CM

## 2023-12-11 PROCEDURE — 99385 PREV VISIT NEW AGE 18-39: CPT | Performed by: OBSTETRICS & GYNECOLOGY

## 2023-12-11 ASSESSMENT — ENCOUNTER SYMPTOMS
GASTROINTESTINAL NEGATIVE: 1
RESPIRATORY NEGATIVE: 1

## 2023-12-20 LAB
COLLECTION METHOD: NORMAL
CYTOLOGIST CVX/VAG CYTO: NORMAL
CYTOLOGY CVX/VAG DOC THIN PREP: NORMAL
DATE OF LMP: NORMAL
HPV APTIMA: NEGATIVE
HPV GENOTYPE REFLEX: NORMAL
Lab: NORMAL
OTHER PT INFO: NORMAL
PAP SOURCE: NORMAL
PATH REPORT.FINAL DX SPEC: NORMAL
PREV CYTO INFO: NORMAL
PREV TREATMENT RESULTS: NORMAL
PREV TREATMENT: NORMAL
STAT OF ADQ CVX/VAG CYTO-IMP: NORMAL

## 2024-01-04 ENCOUNTER — NURSE TRIAGE (OUTPATIENT)
Dept: OTHER | Facility: CLINIC | Age: 29
End: 2024-01-04

## 2024-01-04 NOTE — TELEPHONE ENCOUNTER
Location of patient: [place]    Location of employment: [employer], [place] where injury occurred: Sidewalk walking from the parking lot    Location of injury (body part involved): Both knees and hands    Time of injury: 0628 this AM    Last 4 of SSN: [id number]    Location associate referred to for care: [employer]    Subjective: Caller states \"I fell walking into work\"     Current Symptoms: Walking into work on the sidewalk from the parking lot and there is spot that dips down and back up, it is uneven and she tripped on it, landing on her knees and hands.  Open skin on R knee. Scrapes and cuts on L knee and R hand.  R knee has been oozing all days.  Took bandage off and put a new one on at 1130 and it has oozed thru that one but has not changed that one since.  Bruising on knees is over 2\" in diameter and also has swelling on her knees.     Pain Severity: 5/10; throbbing; constant, moderate    Recommended disposition: See HCP within 4 Hours (or PCP triage)    Care advice provided, caller verbalizes understanding; denies any other questions or concerns.    Caller agrees to be seen at approved location.  Provided her the address and phone number.     Reason for Disposition   [1] Raised bruise AND [2] size > 2 inches (5 cm) AND [3] expanding    Protocols used: Skin Injury-ADULT-

## 2024-06-04 ENCOUNTER — TELEMEDICINE (OUTPATIENT)
Dept: ONCOLOGY | Age: 29
End: 2024-06-04

## 2024-06-04 DIAGNOSIS — Z80.3 FAMILY HISTORY OF BREAST CANCER IN MOTHER: Primary | ICD-10-CM

## 2024-06-06 NOTE — PROGRESS NOTES
Patient did not log on to their appointment until 1:31, and the appointment was not completed. This documentation ended up being opened due to this. The patient was left a message prior to 1:15 asking her to log on to her appointment by 1:15 or to call scheduling if she needed to reschedule or got the message after 1:15. Scheduling's number was left.   
When an individual is born with one nonfunctioning tumor suppressor gene, the one working copy that is protecting the individual from cancer development can acquire a change through various environmental factors (example: UV radiation, diet, smoking, etc.) and can become nonfunctional as well. With two nonfunctional tumor suppressor genes, the individual does not have any of the protection from that gene and is at risk to develop cancer. The specific cancer type(s) an individual is at increased risk for depend on the specific tumor suppressor gene identified to have a pathogenic variant. Changes to medical management may be recommended for this patient if they are shown to have a pathogenic variant in a tumor suppressor gene, and these are dependent on the gene. A majority of cases of hereditary breast cancer are due to pathogenic variants in the BRCA1 and BRCA2 genes, which are associated with Hereditary Breast and Ovarian Cancer Syndrome. There are additional genes that can also cause increased risk for breast cancer, but these make up a smaller proportion of overall cases.    We determined that Jose's family history is suspicious for a hereditary cancer syndrome because of her mother's young age of diagnosis as well as rare and related cancers in the family.    INHERITANCE OF HEREDITARY CANCER SYNDROMES: We discussed that hereditary cancer syndromes usually run through families (or are inherited) in an autosomal dominant pattern. In autosomal dominant conditions, the term \"autosomal\" means that both females and males have an increased chance of having signs or symptoms of a condition. The term \"dominant\" means that an individual needs to have a harmful change in one copy of a gene (either the one inherited from a person's mother or the one inherited from a person's father) to be at risk for the signs or symptoms of a condition. Individuals who have a dominant genetic condition have a 50% (1 in 2) chance of

## 2024-08-13 LAB
CHOLEST SERPL-MCNC: 152 MG/DL (ref 0–200)
GLUCOSE SERPL-MCNC: 93 MG/DL (ref 70–99)
HDLC SERPL-MCNC: 56 MG/DL (ref 40–60)
LDLC SERPL CALC-MCNC: 82 MG/DL (ref 0–100)
TRIGL SERPL-MCNC: 71 MG/DL (ref 0–150)

## 2025-04-01 LAB
CHOLEST SERPL-MCNC: 174 MG/DL (ref 0–200)
GLUCOSE SERPL-MCNC: 89 MG/DL (ref 70–99)
HDLC SERPL-MCNC: 69 MG/DL (ref 40–60)
LDLC SERPL CALC-MCNC: 95 MG/DL (ref 0–100)
TRIGL SERPL-MCNC: 51 MG/DL (ref 0–150)